# Patient Record
Sex: FEMALE | Race: WHITE
[De-identification: names, ages, dates, MRNs, and addresses within clinical notes are randomized per-mention and may not be internally consistent; named-entity substitution may affect disease eponyms.]

---

## 2017-09-21 ENCOUNTER — HOSPITAL ENCOUNTER (OUTPATIENT)
Dept: HOSPITAL 74 - JCHEMO | Age: 82
Discharge: HOME | End: 2017-09-21
Attending: INTERNAL MEDICINE
Payer: COMMERCIAL

## 2017-09-21 VITALS — SYSTOLIC BLOOD PRESSURE: 116 MMHG | HEART RATE: 57 BPM | DIASTOLIC BLOOD PRESSURE: 59 MMHG | TEMPERATURE: 97.6 F

## 2017-09-21 DIAGNOSIS — M81.0: Primary | ICD-10-CM

## 2017-09-21 LAB
ALBUMIN SERPL-MCNC: 3.6 G/DL (ref 3.4–5)
ALP SERPL-CCNC: 55 U/L (ref 45–117)
ALT SERPL-CCNC: 18 U/L (ref 12–78)
ANION GAP SERPL CALC-SCNC: 8 MMOL/L (ref 8–16)
AST SERPL-CCNC: 18 U/L (ref 15–37)
BILIRUB SERPL-MCNC: 0.8 MG/DL (ref 0.2–1)
CALCIUM SERPL-MCNC: 9.3 MG/DL (ref 8.5–10.1)
CO2 SERPL-SCNC: 28 MMOL/L (ref 21–32)
CREAT SERPL-MCNC: 1.2 MG/DL (ref 0.55–1.02)
GLUCOSE SERPL-MCNC: 63 MG/DL (ref 74–106)
PROT SERPL-MCNC: 6.5 G/DL (ref 6.4–8.2)

## 2017-09-21 PROCEDURE — 3E033GC INTRODUCTION OF OTHER THERAPEUTIC SUBSTANCE INTO PERIPHERAL VEIN, PERCUTANEOUS APPROACH: ICD-10-PCS | Performed by: INTERNAL MEDICINE

## 2018-12-17 ENCOUNTER — HOSPITAL ENCOUNTER (EMERGENCY)
Dept: HOSPITAL 74 - JER | Age: 83
Discharge: HOME | End: 2018-12-17
Payer: COMMERCIAL

## 2018-12-17 VITALS — TEMPERATURE: 98.5 F

## 2018-12-17 VITALS — SYSTOLIC BLOOD PRESSURE: 109 MMHG | DIASTOLIC BLOOD PRESSURE: 67 MMHG | HEART RATE: 69 BPM

## 2018-12-17 VITALS — BODY MASS INDEX: 18.6 KG/M2

## 2018-12-17 DIAGNOSIS — Z87.891: ICD-10-CM

## 2018-12-17 DIAGNOSIS — J44.9: ICD-10-CM

## 2018-12-17 DIAGNOSIS — R00.2: Primary | ICD-10-CM

## 2018-12-17 DIAGNOSIS — E78.00: ICD-10-CM

## 2018-12-17 DIAGNOSIS — K75.89: ICD-10-CM

## 2018-12-17 DIAGNOSIS — I48.91: ICD-10-CM

## 2018-12-17 LAB
ALBUMIN SERPL-MCNC: 3.3 G/DL (ref 3.4–5)
ALP SERPL-CCNC: 56 U/L (ref 45–117)
ALT SERPL-CCNC: 19 U/L (ref 13–61)
ANION GAP SERPL CALC-SCNC: 5 MMOL/L (ref 8–16)
APTT BLD: 26.7 SECONDS (ref 25.2–36.5)
AST SERPL-CCNC: 19 U/L (ref 15–37)
BASOPHILS # BLD: 0.9 % (ref 0–2)
BILIRUB SERPL-MCNC: 0.5 MG/DL (ref 0.2–1)
BUN SERPL-MCNC: 28 MG/DL (ref 7–18)
CALCIUM SERPL-MCNC: 8.7 MG/DL (ref 8.5–10.1)
CHLORIDE SERPL-SCNC: 106 MMOL/L (ref 98–107)
CO2 SERPL-SCNC: 26 MMOL/L (ref 21–32)
CREAT SERPL-MCNC: 1.2 MG/DL (ref 0.55–1.3)
DEPRECATED RDW RBC AUTO: 13.2 % (ref 11.6–15.6)
EOSINOPHIL # BLD: 3.2 % (ref 0–4.5)
GLUCOSE SERPL-MCNC: 85 MG/DL (ref 74–106)
HCT VFR BLD CALC: 38.6 % (ref 32.4–45.2)
HGB BLD-MCNC: 13.5 GM/DL (ref 10.7–15.3)
INR BLD: 0.93 (ref 0.83–1.09)
LYMPHOCYTES # BLD: 19.6 % (ref 8–40)
MAGNESIUM SERPL-MCNC: 2.1 MG/DL (ref 1.8–2.4)
MCH RBC QN AUTO: 34 PG (ref 25.7–33.7)
MCHC RBC AUTO-ENTMCNC: 34.9 G/DL (ref 32–36)
MCV RBC: 97.6 FL (ref 80–96)
MONOCYTES # BLD AUTO: 6.4 % (ref 3.8–10.2)
NEUTROPHILS # BLD: 69.9 % (ref 42.8–82.8)
PHOSPHATE SERPL-MCNC: 3.3 MG/DL (ref 2.5–4.9)
PLATELET # BLD AUTO: 250 K/MM3 (ref 134–434)
PMV BLD: 7.6 FL (ref 7.5–11.1)
POTASSIUM SERPLBLD-SCNC: 4.7 MMOL/L (ref 3.5–5.1)
PROT SERPL-MCNC: 6.2 G/DL (ref 6.4–8.2)
PT PNL PPP: 11 SEC (ref 9.7–13)
RBC # BLD AUTO: 3.95 M/MM3 (ref 3.6–5.2)
SODIUM SERPL-SCNC: 137 MMOL/L (ref 136–145)
WBC # BLD AUTO: 7.1 K/MM3 (ref 4–10)

## 2018-12-17 NOTE — PDOC
History of Present Illness





- History of Present Illness


Initial Comments: 





12/17/18 18:29


The patient is an 88 year old female with a past medical history of proximal or 

paroxysmal atrial fibrillation, COPD, and osteoporosis who presents to the 

emergency department for evaluation of lightheadedness and palpitations which 

began at 12pm today. Patient reports feeling lightheaded and palpitations after 

hot yoga today. Patient reports taking an extra diltiazem after yoga today. She 

states I feel that I am in atrial fibrillation. Patient is currently taking 

aspirin. Denies allergies, ablations, and hx of PE, cancer, or recent travels. 

At presentation, patient reports her symptoms are feeling better.





The patient denies chest pain, shortness of breath, headache, nausea, vomiting, 

fevers, chills, and any bowel/urinary symptoms. 





PCP: Dr. Alfaro


Cardiologist: Dr. Ames 


Pulmonologist: Dr. Peña 








<Odilia Mcbride - Last Filed: 12/17/18 18:29>





- General


History Source: Patient


Exam Limitations: No Limitations





<Marc Bunch - Last Filed: 12/21/18 18:08>





- General


Chief Complaint: Chest Pain


Stated Complaint: CHEST PAIN


Time Seen by Provider: 12/17/18 15:09





Past History





<Odilia Mcbride - Last Filed: 12/17/18 18:29>





- Past Medical History


Anemia: No


Cardiac Disorders: Yes (A-FIB)


COPD: Yes


Hypercholesterolemia: Yes


Liver Disease: Yes (mononuclosis hepatitis)





- Surgical History


Lung Surgery: No


Orthopedic Surgery: Yes (RIGHT MIDDLE FINGER SURGERY)





- Immunization History


Td Vaccination: Yes


Immunization Up to Date: Yes





- Suicide/Smoking/Psychosocial Hx


Smoking Status: No


Smoking History: Never smoked


Years of Tobacco Use: 20


Have you smoked in the past 12 months: No


Number of Cigarettes Smoked Daily: 0


If you are a former smoker, when did you quit?: Over 50 years ago


Cigars Per Day: 0


Information on smoking cessation initiated: No


Hx Alcohol Use: No


Drug/Substance Use Hx: No


Substance Use Type: None


Hx Substance Use Treatment: No





<Marc Bunch - Last Filed: 12/21/18 18:08>





- Past Medical History


Allergies/Adverse Reactions: 


 Allergies











Allergy/AdvReac Type Severity Reaction Status Date / Time


 


No Known Allergies Allergy   Verified 12/17/18 17:48











Home Medications: 


Ambulatory Orders





Cyanocobalamin (Vitamin B-12) [Vitamin B-12] 1 tab SL ASDIR 07/06/13 


Multivitamin [Multivitamins] 1 each PO DAILY 07/06/13 


Diltiazem Cd [Cardizem Cd -] 180 mg PO DAILY #0 cap.cd.24h 09/28/13 


Metoprolol Succinate [Toprol XL -] 25 mg PO DAILY #0 tab.sr.24h 09/28/13 


Aspirin [ASA -] 81 mg PO DAILY 10/07/15 


Calcium Carbonate [Calcium] 500 mg PO DAILY 10/07/15 











**Review of Systems





- Review of Systems


Able to Perform ROS?: Yes


Comments:: 


GENERAL/CONSTITUTIONAL: No fever or chills. No weakness.


HEAD, EYES, EARS, NOSE AND THROAT: No change in vision. No ear pain or 

discharge. No sore throat.


CARDIOVASCULAR: (+)Palpitations. No chest pain or shortness of breath.


RESPIRATORY: No cough, wheezing, or hemoptysis.


GASTROINTESTINAL: No nausea, vomiting, diarrhea or constipation.


GENITOURINARY: No dysuria, frequency, or change in urination.


MUSCULOSKELETAL: No joint or muscle swelling or pain. No neck or back pain.


SKIN: No rash


NEUROLOGIC: (+)Lightheadedness. No headache, vertigo, loss of consciousness, or 

change in strength/sensation.


ENDOCRINE: No increased thirst. No abnormal weight change.


HEMATOLOGIC/LYMPHATIC: No anemia, easy bleeding, or history of blood clots.


ALLERGIC/IMMUNOLOGIC: No hives or skin allergy.








<Odilia Mcbride - Last Filed: 12/17/18 18:29>





*Physical Exam





- Vital Signs


 Last Vital Signs











Temp Pulse Resp BP Pulse Ox


 


 98.5 F   69   18   109/67   100 


 


 12/17/18 15:01  12/17/18 18:02  12/17/18 18:02  12/17/18 18:02  12/17/18 18:02














- Physical Exam


Comments: 


GENERAL: Awake, alert, and fully oriented, in no acute distress


HEAD: No signs of trauma


EYES: PERRLA, EOMI, sclera anicteric, conjunctiva clear


NECK: Normal ROM, supple, no lymphadenopathy, JVD, or masses


LUNGS: Breath sounds equal, clear to auscultation bilaterally.  No wheezes, and 

no crackles


HEART: Regular rate and rhythm, normal S1 and S2, no murmurs, rubs or gallops


ABDOMEN: Soft, nontender, normoactive bowel sounds.  No guarding, no rebound.  

No masses


EXTREMITIES: Normal range of motion, no edema.  No clubbing or cyanosis. No 

cords, erythema, or tenderness


NEUROLOGICAL: Cranial nerves II through XII grossly intact.  Normal speech, 

normal gait


SKIN: Warm, Dry, normal turgor, no rashes or lesions noted.








<Odilia Mcbride - Last Filed: 12/17/18 18:29>





- Vital Signs


 Last Vital Signs











Temp Pulse Resp BP Pulse Ox


 


 98.5 F   89   16   122/85   100 


 


 12/17/18 15:01  12/17/18 15:01  12/17/18 15:01  12/17/18 15:01  12/17/18 15:01














<Marc Bunch - Last Filed: 12/21/18 18:08>





Moderate Sedation





- Procedure Monitoring


Vital Signs: 


Procedure Monitoring Vital Signs











Temperature  98.5 F   12/17/18 15:01


 


Pulse Rate  69   12/17/18 18:02


 


Respiratory Rate  18   12/17/18 18:02


 


Blood Pressure  109/67   12/17/18 18:02


 


O2 Sat by Pulse Oximetry (%)  100   12/17/18 18:02











<Odilia Mcbride - Last Filed: 12/17/18 18:29>





- Procedure Monitoring


Vital Signs: 


Procedure Monitoring Vital Signs











Temperature  98.5 F   12/17/18 15:01


 


Pulse Rate  89   12/17/18 15:01


 


Respiratory Rate  16   12/17/18 15:01


 


Blood Pressure  122/85   12/17/18 15:01


 


O2 Sat by Pulse Oximetry (%)  100   12/17/18 15:01











<Marc Bunch - Last Filed: 12/21/18 18:08>





**Heart Score/ECG Review


  ** #1


ECG reviewed & interpreted by me at: 15:05





12/17/18 16:02


, no std/tristen, normal axis, normal intervals,  msec





<Marc Bunch - Last Filed: 12/21/18 18:08>





ED Treatment Course





- LABORATORY


CBC & Chemistry Diagram: 


 12/17/18 15:16





 12/17/18 17:10





- ADDITIONAL ORDERS


Additional order review: 


 Laboratory  Results











  12/17/18 12/17/18 12/17/18





  17:10 15:16 15:16


 


PT with INR    11.00


 


INR    0.93


 


PTT (Actin FS)    26.7


 


Sodium  137  Cancelled 


 


Potassium  4.7  Cancelled 


 


Chloride  106  Cancelled 


 


Carbon Dioxide  26  Cancelled 


 


Anion Gap  5 L  Cancelled 


 


BUN  28 H  Cancelled 


 


Creatinine  1.2  Cancelled 


 


Creat Clearance w eGFR  42.40  Cancelled 


 


Random Glucose  85  Cancelled 


 


Calcium  8.7  Cancelled 


 


Phosphorus  3.3  Cancelled 


 


Magnesium  2.1  Cancelled 


 


Total Bilirubin  0.5  Cancelled 


 


AST  19  Cancelled 


 


ALT  19  Cancelled 


 


Alkaline Phosphatase  56  Cancelled 


 


Creatine Kinase  44  Cancelled 


 


Troponin I  < 0.02  Cancelled 


 


Total Protein  6.2 L  Cancelled 


 


Albumin  3.3 L  Cancelled 


 


TSH  1.60  Cancelled 








 











  12/17/18





  15:16


 


RBC  3.95


 


MCV  97.6 H


 


MCHC  34.9


 


RDW  13.2


 


MPV  7.6


 


Neutrophils %  69.9


 


Lymphocytes %  19.6


 


Monocytes %  6.4


 


Eosinophils %  3.2


 


Basophils %  0.9














<Odilia Mcbride - Last Filed: 12/17/18 18:29>





- LABORATORY


CBC & Chemistry Diagram: 


 12/17/18 15:16





 12/17/18 17:10





- ADDITIONAL ORDERS


Additional order review: 


 Laboratory  Results











  12/17/18





  15:16


 


PT with INR  11.00


 


INR  0.93








 











  12/17/18





  15:16


 


RBC  3.95


 


MCV  97.6 H


 


MCHC  34.9


 


RDW  13.2


 


MPV  7.6


 


Neutrophils %  69.9


 


Lymphocytes %  19.6


 


Monocytes %  6.4


 


Eosinophils %  3.2


 


Basophils %  0.9














- RADIOLOGY


Radiology Studies Ordered: 














 Category Date Time Status


 


 CHEST X-RAY PORTABLE* [RAD] [facility] Radiology  12/17/18 15:30 Ordered














<Marc Bunch - Last Filed: 12/21/18 18:08>





Medical Decision Making





- Medical Decision Making





12/17/18 15:58





A portion of this note was documented by scribe services under my direction. I 

have reviewed the details of the note, within reason, and agree with the 

documentation with the following case summary and management plan written by 

me. 





Patient treated in the ED.





Nursing notes are reviewed and incorporated into the medical decision-making.


Vital signs reviewed.





Peripheral IV access obtained by the nurse, laboratory studies are drawn and 

sent, reviewed and interpreted by myself. 





Vital Signs











Temp Pulse Resp BP Pulse Ox


 


 98.5 F   89   16   122/85   100 


 


 12/17/18 15:01  12/17/18 15:01  12/17/18 15:01  12/17/18 15:01  12/17/18 15:01





88-year-old female with past medical history of proximal A. fib on aspirin 

presents with irregular palpitations and lightheadedness. The patient was in 

her usual state health and feeling well. She was doing yoga at 12:30 PM when 

she felt the symptoms. Patient felt like she had return of her paryxosymal A. 

fib. Denied chest pain. Denies recent illnesses, fevers, chills, cough, vomiting

, diarrhea. Patient reported that her last each or fibrillation was several 

years ago that did not require ablation.





Patient is currently sinus here now. The patient may have potentially have an 

episode of proximal A. fib. We'll obtain labs, troponin, TSH. We will consult 

[place] [facility]. 


12/17/18 18:18





 CBC, BMP





 12/17/18 15:16 





 12/17/18 17:10 





 CMP











Sodium  137 mmol/L (136-145)   12/17/18  17:10    


 


Potassium  4.7 mmol/L (3.5-5.1)   12/17/18  17:10    


 


Chloride  106 mmol/L ()   12/17/18  17:10    


 


Carbon Dioxide  26 mmol/L (21-32)   12/17/18  17:10    


 


Anion Gap  5 MMOL/L (8-16)  L  12/17/18  17:10    


 


BUN  28 mg/dL (7-18)  H  12/17/18  17:10    


 


Creatinine  1.2 mg/dL (0.55-1.3)   12/17/18  17:10    


 


Creat Clearance w eGFR  42.40  (>60)   12/17/18  17:10    


 


Random Glucose  85 mg/dL ()   12/17/18  17:10    


 


Calcium  8.7 mg/dL (8.5-10.1)   12/17/18  17:10    


 


Phosphorus  3.3 mg/dL (2.5-4.9)   12/17/18  17:10    


 


Magnesium  2.1 mg/dL (1.8-2.4)   12/17/18  17:10    


 


Total Bilirubin  0.5 mg/dL (0.2-1)   12/17/18  17:10    


 


AST  19 U/L (15-37)   12/17/18  17:10    


 


ALT  19 U/L (13-61)   12/17/18  17:10    


 


Alkaline Phosphatase  56 U/L ()   12/17/18  17:10    


 


Creatine Kinase  44 IU/L ()   12/17/18  17:10    


 


Troponin I  < 0.02 ng/ml (0.00-0.05)   12/17/18  17:10    


 


Total Protein  6.2 g/dl (6.4-8.2)  L  12/17/18  17:10    


 


Albumin  3.3 g/dl (3.4-5.0)  L  12/17/18  17:10    


 


TSH  1.60 uIU/ml (0.358-3.74)   12/17/18  17:10    








Case discussed with Dr. Larsen. Given that the patient is sinus now and no 

symptoms, Dr. Larsen agrees that the patient can follow up as an outpatient 

this week.


Pt is agreeable withp messi.


Copy of her workup given to the patient.


Pt verbalizes understanding and agrees with plan.


Return precautions given.





I discussed the physical exam findings, ancillary test results and final 

diagnoses with the patient.  I answered all of the patient's questions.  The 

patient was satisfied with the care received and felt comfortable with the 

discharge plan and treatment plan.  The patient will call their primary care 

physician within 24 hours to arrange follow-up and will return to the Emergency 

Department with any new, persistant or worsening symptoms. 


12/21/18 18:05


Official chest xray shows mild cardiomegaly and "some upper lobe atelectasis 

changes and possible infiltrates". Requesting official 1 week chest xray.


I called the patient and left a message on voicemail.


I also contacted the pt's son and left a message.


Will place pt's information for call list for NP callback.





<Marc Bunch - Last Filed: 12/21/18 18:08>





*DC/Admit/Observation/Transfer





- Attestations


Scribe Attestion: 


Documentation prepared by Odilia Mcbride, acting as medical scribe for Marc Bunch MD.





<Odilia Mcbride - Last Filed: 12/17/18 18:29>





- Discharge Dispostion


Decision to Admit order: No





<Marc Bunch - Last Filed: 12/21/18 18:08>


Diagnosis at time of Disposition: 


 Palpitations








- Discharge Dispostion


Disposition: HOME


Condition at time of disposition: Stable





- Referrals


Referrals: 


Gonzales Alfaro MD [Primary Care Provider] - 


Chelsey Larsen MD [Staff Physician] - 





- Patient Instructions


Printed Discharge Instructions:  DI for Palpitations


Additional Instructions: 


Your workup is normal.


Please follow up with your primary care physician and cardiologist.


Call Dr. Larsen's office this week to return to the ER.

## 2018-12-18 NOTE — EKG
Test Reason : 

Blood Pressure : ***/*** mmHG

Vent. Rate : 114 BPM     Atrial Rate : 114 BPM

   P-R Int : 186 ms          QRS Dur : 076 ms

    QT Int : 296 ms       P-R-T Axes : 066 000 038 degrees

   QTc Int : 407 ms

 

SINUS TACHYCARDIA

OTHERWISE NORMAL ECG

Confirmed by MD FELIPE, ROSEMARY ([id number]) on 12/18/2018 4:29:35 PM

 

Referred By:             Confirmed By:ROSEMARY WANG MD